# Patient Record
Sex: FEMALE | Race: BLACK OR AFRICAN AMERICAN | NOT HISPANIC OR LATINO | Employment: FULL TIME | URBAN - METROPOLITAN AREA
[De-identification: names, ages, dates, MRNs, and addresses within clinical notes are randomized per-mention and may not be internally consistent; named-entity substitution may affect disease eponyms.]

---

## 2023-04-18 ENCOUNTER — OFFICE VISIT (OUTPATIENT)
Dept: PRIMARY CARE | Facility: CLINIC | Age: 43
End: 2023-04-18
Payer: COMMERCIAL

## 2023-04-18 ENCOUNTER — LAB (OUTPATIENT)
Dept: LAB | Facility: LAB | Age: 43
End: 2023-04-18
Payer: COMMERCIAL

## 2023-04-18 VITALS
OXYGEN SATURATION: 100 % | SYSTOLIC BLOOD PRESSURE: 154 MMHG | HEIGHT: 64 IN | HEART RATE: 74 BPM | BODY MASS INDEX: 32.4 KG/M2 | WEIGHT: 189.8 LBS | DIASTOLIC BLOOD PRESSURE: 100 MMHG

## 2023-04-18 DIAGNOSIS — I10 PRIMARY HYPERTENSION: ICD-10-CM

## 2023-04-18 DIAGNOSIS — J45.20 MILD INTERMITTENT ASTHMA WITHOUT COMPLICATION (HHS-HCC): ICD-10-CM

## 2023-04-18 DIAGNOSIS — R35.0 URINARY FREQUENCY: ICD-10-CM

## 2023-04-18 DIAGNOSIS — Z00.00 HEALTHCARE MAINTENANCE: ICD-10-CM

## 2023-04-18 DIAGNOSIS — Z00.00 HEALTHCARE MAINTENANCE: Primary | ICD-10-CM

## 2023-04-18 PROCEDURE — 3077F SYST BP >= 140 MM HG: CPT | Performed by: PHYSICIAN ASSISTANT

## 2023-04-18 PROCEDURE — 3080F DIAST BP >= 90 MM HG: CPT | Performed by: PHYSICIAN ASSISTANT

## 2023-04-18 PROCEDURE — 82306 VITAMIN D 25 HYDROXY: CPT

## 2023-04-18 PROCEDURE — 99396 PREV VISIT EST AGE 40-64: CPT | Performed by: PHYSICIAN ASSISTANT

## 2023-04-18 PROCEDURE — 80061 LIPID PANEL: CPT

## 2023-04-18 PROCEDURE — 87086 URINE CULTURE/COLONY COUNT: CPT

## 2023-04-18 PROCEDURE — 80053 COMPREHEN METABOLIC PANEL: CPT

## 2023-04-18 PROCEDURE — 36415 COLL VENOUS BLD VENIPUNCTURE: CPT

## 2023-04-18 PROCEDURE — 1036F TOBACCO NON-USER: CPT | Performed by: PHYSICIAN ASSISTANT

## 2023-04-18 PROCEDURE — 81003 URINALYSIS AUTO W/O SCOPE: CPT

## 2023-04-18 PROCEDURE — 84443 ASSAY THYROID STIM HORMONE: CPT

## 2023-04-18 PROCEDURE — 83036 HEMOGLOBIN GLYCOSYLATED A1C: CPT

## 2023-04-18 PROCEDURE — 85025 COMPLETE CBC W/AUTO DIFF WBC: CPT

## 2023-04-18 RX ORDER — LEVONORGESTREL 52 MG/1
1 INTRAUTERINE DEVICE INTRAUTERINE ONCE
COMMUNITY

## 2023-04-18 RX ORDER — FLUTICASONE PROPIONATE 50 MCG
2 SPRAY, SUSPENSION (ML) NASAL 2 TIMES DAILY
COMMUNITY
Start: 2023-04-07

## 2023-04-18 ASSESSMENT — PATIENT HEALTH QUESTIONNAIRE - PHQ9
1. LITTLE INTEREST OR PLEASURE IN DOING THINGS: NOT AT ALL
2. FEELING DOWN, DEPRESSED OR HOPELESS: NOT AT ALL
SUM OF ALL RESPONSES TO PHQ9 QUESTIONS 1 AND 2: 0

## 2023-04-18 NOTE — PROGRESS NOTES
"Subjective   Sunita Real is a 42 y.o. female who presents for Annual Exam.  HPI 42-year-old female presenting for physical exam. Overall doing well.  Currently complaining of:    Sinus troubles: went to  and got ATBs, didn't get relief. Scheduled with ENT and they saw no infection or polyps. She was using Afrin nasal spray daily and developed rhinitis medicamentosa. ENT prescribed her oral steroids (finished yesterday) and she has received some relief of symptoms. She is using Flonase nasal spray 2x daily. Scheduled in June with ENT again  Urinary frequency. No other symptoms of UTI (fevers, chills, confusion, hematuria, dysuria, burning with urination, urinary urgency)    HTN: Not currently on any medications, managing through diet and exercise.  Endorses having occasional headache. Denies any dizziness, chest pain, SOB/KAMARA, LE edema.     Health maintenance:  Immunizations:  -Flu: deferred to fall 2023  -Tdap: Due, obtain from office or pharmacy  Mammogram UTD (last 11/13/2022)   PAP UTD (last 10/8/2020)   Dental care: follows for annual cleanings  Eye care: none, no contact lenses or glasses    Last CPE: 4/18/2023 (commercial)    BP (!) 154/100   Pulse 74   Ht 1.626 m (5' 4\")   Wt 86.1 kg (189 lb 12.8 oz)   SpO2 100%   BMI 32.58 kg/m²   Objective   Physical Exam  Vitals reviewed.   Constitutional:       General: She is not in acute distress.     Appearance: Normal appearance.   HENT:      Head: Normocephalic and atraumatic.      Right Ear: Tympanic membrane, ear canal and external ear normal. There is no impacted cerumen.      Left Ear: Tympanic membrane, ear canal and external ear normal. There is no impacted cerumen.      Nose: Nose normal. No congestion or rhinorrhea.      Mouth/Throat:      Mouth: Mucous membranes are moist.      Pharynx: Oropharynx is clear. No oropharyngeal exudate or posterior oropharyngeal erythema.   Eyes:      General: No scleral icterus.        Right eye: No " discharge.         Left eye: No discharge.      Extraocular Movements: Extraocular movements intact.      Conjunctiva/sclera: Conjunctivae normal.      Pupils: Pupils are equal, round, and reactive to light.   Cardiovascular:      Rate and Rhythm: Normal rate and regular rhythm.      Heart sounds: Normal heart sounds. No murmur heard.     No friction rub. No gallop.   Pulmonary:      Effort: Pulmonary effort is normal. No respiratory distress.      Breath sounds: Normal breath sounds. No stridor. No wheezing, rhonchi or rales.   Abdominal:      General: Bowel sounds are normal. There is no distension.      Palpations: Abdomen is soft. There is no mass.      Tenderness: There is no abdominal tenderness. There is no right CVA tenderness or left CVA tenderness.   Musculoskeletal:         General: Normal range of motion.      Cervical back: Normal range of motion and neck supple.      Right lower leg: No edema.      Left lower leg: No edema.   Skin:     General: Skin is warm and dry.      Findings: No rash.   Neurological:      General: No focal deficit present.      Mental Status: She is alert and oriented to person, place, and time. Mental status is at baseline.      Cranial Nerves: No cranial nerve deficit.      Gait: Gait normal.   Psychiatric:         Mood and Affect: Mood normal.         Behavior: Behavior normal.       Assessment/Plan   Problem List Items Addressed This Visit       Healthcare maintenance - Primary     Discussed age-appropriate preventative health measures.  CPE labs ordered         Relevant Orders    CBC and Auto Differential (Completed)    Comprehensive Metabolic Panel (Completed)    Vitamin D, Total (Completed)    TSH with reflex to Free T4 if abnormal (Completed)    Hemoglobin A1C (Completed)    Lipid Panel (Completed)    Urinary frequency     Urinalysis and urine culture ordered.  Increase water intake.  We will treat as clinically indicated         Relevant Orders    Urine Culture  (Completed)    Urinalysis with Reflex Microscopic (Completed)    Mild intermittent asthma without complication     Stable.  Refilled albuterol inhaler         Relevant Medications    albuterol 90 mcg/actuation inhaler    Primary hypertension     Patient deferred initiation of medication at this time, will work on diet and exercise.  RTC in 4 to 6 weeks or sooner as needed.  I recommend initiation of losartan 25 mg with titration up from there if needed.  Follow strict DASH diet and exercise regularly.  Monitor BP at home and log readings

## 2023-04-19 LAB
ALANINE AMINOTRANSFERASE (SGPT) (U/L) IN SER/PLAS: 20 U/L (ref 7–45)
ALBUMIN (G/DL) IN SER/PLAS: 4.3 G/DL (ref 3.4–5)
ALKALINE PHOSPHATASE (U/L) IN SER/PLAS: 61 U/L (ref 33–110)
ANION GAP IN SER/PLAS: 12 MMOL/L (ref 10–20)
APPEARANCE, URINE: CLEAR
ASPARTATE AMINOTRANSFERASE (SGOT) (U/L) IN SER/PLAS: 21 U/L (ref 9–39)
BASOPHILS (10*3/UL) IN BLOOD BY AUTOMATED COUNT: 0.02 X10E9/L (ref 0–0.1)
BASOPHILS/100 LEUKOCYTES IN BLOOD BY AUTOMATED COUNT: 0.3 % (ref 0–2)
BILIRUBIN TOTAL (MG/DL) IN SER/PLAS: 0.5 MG/DL (ref 0–1.2)
BILIRUBIN, URINE: NEGATIVE
BLOOD, URINE: NEGATIVE
CALCIDIOL (25 OH VITAMIN D3) (NG/ML) IN SER/PLAS: 36 NG/ML
CALCIUM (MG/DL) IN SER/PLAS: 9.5 MG/DL (ref 8.6–10.6)
CARBON DIOXIDE, TOTAL (MMOL/L) IN SER/PLAS: 30 MMOL/L (ref 21–32)
CHLORIDE (MMOL/L) IN SER/PLAS: 101 MMOL/L (ref 98–107)
CHOLESTEROL (MG/DL) IN SER/PLAS: 190 MG/DL (ref 0–199)
CHOLESTEROL IN HDL (MG/DL) IN SER/PLAS: 49.8 MG/DL
CHOLESTEROL/HDL RATIO: 3.8
COLOR, URINE: ABNORMAL
CREATININE (MG/DL) IN SER/PLAS: 0.83 MG/DL (ref 0.5–1.05)
EOSINOPHILS (10*3/UL) IN BLOOD BY AUTOMATED COUNT: 0.09 X10E9/L (ref 0–0.7)
EOSINOPHILS/100 LEUKOCYTES IN BLOOD BY AUTOMATED COUNT: 1.5 % (ref 0–6)
ERYTHROCYTE DISTRIBUTION WIDTH (RATIO) BY AUTOMATED COUNT: 11.8 % (ref 11.5–14.5)
ERYTHROCYTE MEAN CORPUSCULAR HEMOGLOBIN CONCENTRATION (G/DL) BY AUTOMATED: 31.6 G/DL (ref 32–36)
ERYTHROCYTE MEAN CORPUSCULAR VOLUME (FL) BY AUTOMATED COUNT: 100 FL (ref 80–100)
ERYTHROCYTES (10*6/UL) IN BLOOD BY AUTOMATED COUNT: 4.09 X10E12/L (ref 4–5.2)
ESTIMATED AVERAGE GLUCOSE FOR HBA1C: 94 MG/DL
GFR FEMALE: 90 ML/MIN/1.73M2
GLUCOSE (MG/DL) IN SER/PLAS: 81 MG/DL (ref 74–99)
GLUCOSE, URINE: NEGATIVE MG/DL
HEMATOCRIT (%) IN BLOOD BY AUTOMATED COUNT: 40.8 % (ref 36–46)
HEMOGLOBIN (G/DL) IN BLOOD: 12.9 G/DL (ref 12–16)
HEMOGLOBIN A1C/HEMOGLOBIN TOTAL IN BLOOD: 4.9 %
IMMATURE GRANULOCYTES/100 LEUKOCYTES IN BLOOD BY AUTOMATED COUNT: 0.2 % (ref 0–0.9)
KETONES, URINE: NEGATIVE MG/DL
LDL: 127 MG/DL (ref 0–99)
LEUKOCYTE ESTERASE, URINE: NEGATIVE
LEUKOCYTES (10*3/UL) IN BLOOD BY AUTOMATED COUNT: 6 X10E9/L (ref 4.4–11.3)
LYMPHOCYTES (10*3/UL) IN BLOOD BY AUTOMATED COUNT: 1.59 X10E9/L (ref 1.2–4.8)
LYMPHOCYTES/100 LEUKOCYTES IN BLOOD BY AUTOMATED COUNT: 26.5 % (ref 13–44)
MONOCYTES (10*3/UL) IN BLOOD BY AUTOMATED COUNT: 0.72 X10E9/L (ref 0.1–1)
MONOCYTES/100 LEUKOCYTES IN BLOOD BY AUTOMATED COUNT: 12 % (ref 2–10)
NEUTROPHILS (10*3/UL) IN BLOOD BY AUTOMATED COUNT: 3.57 X10E9/L (ref 1.2–7.7)
NEUTROPHILS/100 LEUKOCYTES IN BLOOD BY AUTOMATED COUNT: 59.5 % (ref 40–80)
NITRITE, URINE: NEGATIVE
NRBC (PER 100 WBCS) BY AUTOMATED COUNT: 0 /100 WBC (ref 0–0)
PH, URINE: 7 (ref 5–8)
PLATELETS (10*3/UL) IN BLOOD AUTOMATED COUNT: 295 X10E9/L (ref 150–450)
POTASSIUM (MMOL/L) IN SER/PLAS: 4.1 MMOL/L (ref 3.5–5.3)
PROTEIN TOTAL: 8.3 G/DL (ref 6.4–8.2)
PROTEIN, URINE: NEGATIVE MG/DL
SODIUM (MMOL/L) IN SER/PLAS: 139 MMOL/L (ref 136–145)
SPECIFIC GRAVITY, URINE: 1 (ref 1–1.03)
THYROTROPIN (MIU/L) IN SER/PLAS BY DETECTION LIMIT <= 0.05 MIU/L: 1.33 MIU/L (ref 0.44–3.98)
TRIGLYCERIDE (MG/DL) IN SER/PLAS: 65 MG/DL (ref 0–149)
UREA NITROGEN (MG/DL) IN SER/PLAS: 10 MG/DL (ref 6–23)
URINE CULTURE: NORMAL
UROBILINOGEN, URINE: <2 MG/DL (ref 0–1.9)
VLDL: 13 MG/DL (ref 0–40)

## 2023-04-20 PROBLEM — J30.2 SEASONAL ALLERGIES: Status: ACTIVE | Noted: 2023-04-20

## 2023-04-20 PROBLEM — L05.91 PILONIDAL CYST: Status: RESOLVED | Noted: 2023-04-20 | Resolved: 2023-04-20

## 2023-04-20 PROBLEM — J30.89 SEASONAL AND PERENNIAL ALLERGIC RHINOCONJUNCTIVITIS OF BOTH EYES: Status: ACTIVE | Noted: 2023-04-20

## 2023-04-20 PROBLEM — N89.8 VAGINAL DISCHARGE: Status: RESOLVED | Noted: 2023-04-20 | Resolved: 2023-04-20

## 2023-04-20 PROBLEM — J45.20 MILD INTERMITTENT ASTHMA WITHOUT COMPLICATION (HHS-HCC): Status: ACTIVE | Noted: 2023-04-20

## 2023-04-20 PROBLEM — L80 VITILIGO: Status: ACTIVE | Noted: 2023-04-20

## 2023-04-20 PROBLEM — R00.2 PALPITATION: Status: ACTIVE | Noted: 2023-04-20

## 2023-04-20 PROBLEM — H10.13 SEASONAL AND PERENNIAL ALLERGIC RHINOCONJUNCTIVITIS OF BOTH EYES: Status: ACTIVE | Noted: 2023-04-20

## 2023-04-20 PROBLEM — T78.02XA ANAPHYLAXIS DUE TO SHELLFISH: Status: ACTIVE | Noted: 2023-04-20

## 2023-04-20 PROBLEM — Z97.5 IUD (INTRAUTERINE DEVICE) IN PLACE: Status: ACTIVE | Noted: 2023-04-20

## 2023-04-20 PROBLEM — R01.1 MURMUR: Status: ACTIVE | Noted: 2023-04-20

## 2023-04-20 PROBLEM — J30.2 SEASONAL AND PERENNIAL ALLERGIC RHINOCONJUNCTIVITIS OF BOTH EYES: Status: ACTIVE | Noted: 2023-04-20

## 2023-04-20 PROBLEM — R35.0 URINARY FREQUENCY: Status: ACTIVE | Noted: 2023-04-20

## 2023-04-20 PROBLEM — Z00.00 HEALTHCARE MAINTENANCE: Status: ACTIVE | Noted: 2023-04-20

## 2023-04-20 PROBLEM — R03.0 ELEVATED BLOOD PRESSURE READING WITHOUT DIAGNOSIS OF HYPERTENSION: Status: RESOLVED | Noted: 2023-04-20 | Resolved: 2023-04-20

## 2023-04-20 PROBLEM — L02.92 BOIL: Status: RESOLVED | Noted: 2023-04-20 | Resolved: 2023-04-20

## 2023-04-20 PROBLEM — I10 PRIMARY HYPERTENSION: Status: ACTIVE | Noted: 2023-04-20

## 2023-04-20 RX ORDER — ALBUTEROL SULFATE 90 UG/1
2 AEROSOL, METERED RESPIRATORY (INHALATION) EVERY 6 HOURS PRN
Qty: 18 G | Refills: 5 | Status: SHIPPED | OUTPATIENT
Start: 2023-04-20

## 2023-04-20 NOTE — ASSESSMENT & PLAN NOTE
Patient deferred initiation of medication at this time, will work on diet and exercise.  RTC in 4 to 6 weeks or sooner as needed.  I recommend initiation of losartan 25 mg with titration up from there if needed.  Follow strict DASH diet and exercise regularly.  Monitor BP at home and log readings   denies

## 2023-04-20 NOTE — ASSESSMENT & PLAN NOTE
Urinalysis and urine culture ordered.  Increase water intake.  We will treat as clinically indicated

## 2023-04-20 NOTE — RESULT ENCOUNTER NOTE
Lab results are back.    Protein level was slightly above normal.  If following a high-protein diet, cut back slightly and increase water intake.    Lipid panel overall looks good with the exception of elevated LDL (bad cholesterol).  Cut back on any saturated fats and carbohydrates.  Increase exercise level as tolerated    Vitamin D level was in the low normal range.  I encouraged her to begin taking an over-the-counter vitamin D3 1000 unit supplement daily with food    Blood counts, hemoglobin A1c, thyroid levels all look good.  Urine was negative for any UTI.  No antibiotics needed.

## 2023-04-21 ENCOUNTER — TELEPHONE (OUTPATIENT)
Dept: PRIMARY CARE | Facility: CLINIC | Age: 43
End: 2023-04-21
Payer: COMMERCIAL

## 2023-04-21 NOTE — TELEPHONE ENCOUNTER
Pt informed of results.    ----- Message from Eboni Troy PA-C sent at 4/19/2023  9:08 PM EDT -----  Lab results are back.    Protein level was slightly above normal.  If following a high-protein diet, cut back slightly and increase water intake.    Lipid panel overall looks good with the exception of elevated LDL (bad cholesterol).  Cut back on any saturated fats and carbohydrates.  Increase exercise level as tolerated    Vitamin D level was in the low normal range.  I encouraged her to begin taking an over-the-counter vitamin D3 1000 unit supplement daily with food    Blood counts, hemoglobin A1c, thyroid levels all look good.  Urine was negative for any UTI.  No antibiotics needed.

## 2023-06-30 ENCOUNTER — APPOINTMENT (OUTPATIENT)
Dept: PRIMARY CARE | Facility: CLINIC | Age: 43
End: 2023-06-30
Payer: COMMERCIAL

## 2023-07-13 ENCOUNTER — APPOINTMENT (OUTPATIENT)
Dept: PRIMARY CARE | Facility: CLINIC | Age: 43
End: 2023-07-13
Payer: COMMERCIAL

## 2023-08-14 ENCOUNTER — OFFICE VISIT (OUTPATIENT)
Dept: PRIMARY CARE | Facility: CLINIC | Age: 43
End: 2023-08-14
Payer: COMMERCIAL

## 2023-08-14 VITALS
TEMPERATURE: 98.3 F | SYSTOLIC BLOOD PRESSURE: 139 MMHG | BODY MASS INDEX: 31.93 KG/M2 | WEIGHT: 186 LBS | DIASTOLIC BLOOD PRESSURE: 86 MMHG | HEART RATE: 77 BPM | OXYGEN SATURATION: 97 % | RESPIRATION RATE: 16 BRPM

## 2023-08-14 DIAGNOSIS — I10 PRIMARY HYPERTENSION: Primary | ICD-10-CM

## 2023-08-14 DIAGNOSIS — E78.5 HYPERLIPIDEMIA, UNSPECIFIED HYPERLIPIDEMIA TYPE: ICD-10-CM

## 2023-08-14 PROCEDURE — 3075F SYST BP GE 130 - 139MM HG: CPT | Performed by: STUDENT IN AN ORGANIZED HEALTH CARE EDUCATION/TRAINING PROGRAM

## 2023-08-14 PROCEDURE — 1036F TOBACCO NON-USER: CPT | Performed by: STUDENT IN AN ORGANIZED HEALTH CARE EDUCATION/TRAINING PROGRAM

## 2023-08-14 PROCEDURE — 3079F DIAST BP 80-89 MM HG: CPT | Performed by: STUDENT IN AN ORGANIZED HEALTH CARE EDUCATION/TRAINING PROGRAM

## 2023-08-14 PROCEDURE — 99204 OFFICE O/P NEW MOD 45 MIN: CPT | Performed by: STUDENT IN AN ORGANIZED HEALTH CARE EDUCATION/TRAINING PROGRAM

## 2023-08-14 RX ORDER — HYDROCHLOROTHIAZIDE 25 MG/1
25 TABLET ORAL DAILY
Qty: 30 TABLET | Refills: 2 | Status: SHIPPED | OUTPATIENT
Start: 2023-08-14 | End: 2023-11-15 | Stop reason: SDUPTHER

## 2023-08-14 ASSESSMENT — ENCOUNTER SYMPTOMS
WHEEZING: 0
SHORTNESS OF BREATH: 0
WEAKNESS: 0
NUMBNESS: 0
HEMATURIA: 0
ACTIVITY CHANGE: 0
CONSTIPATION: 0
DIZZINESS: 0
FEVER: 0
SPEECH DIFFICULTY: 0
COUGH: 0
DYSURIA: 0
ABDOMINAL PAIN: 0
VOMITING: 0
NAUSEA: 0

## 2023-08-14 NOTE — PROGRESS NOTES
Subjective   Patient ID: Sunita Real is a 42 y.o. female who presents for Hypertension.  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  She was recently seen in the emergency room when she experienced dizziness.  She was diagnosed to have elevated blood pressure-reports at least systolic in the 200s, was sent home with 12.5 mg hydrochlorothiazide.  Today she presents to recheck her blood pressure.  Denies any complaints or concerns.    She denies any hospitalizations or surgeries in the past    Past Medical History:   Diagnosis Date    Boil 04/20/2023    Elevated blood pressure reading without diagnosis of hypertension 04/20/2023    Other specified health status 10/09/2014    Known health problems: none    Pilonidal cyst 04/20/2023    Vaginal discharge 04/20/2023      No past surgical history on file.   Family History   Problem Relation Name Age of Onset    Hypertension Mother      Hypertension Brother      Lung cancer Maternal Grandmother          smoker    Diabetes Neg Hx      Heart disease Neg Hx      Sudden death Neg Hx        Allergies   Allergen Reactions    Shellfish Derived Anaphylaxis    Penicillins Hives          Occupation:     Review of Systems   Constitutional:  Negative for activity change and fever.   HENT:  Negative for congestion.    Respiratory:  Negative for cough, shortness of breath and wheezing.    Cardiovascular:  Negative for chest pain and leg swelling.   Gastrointestinal:  Negative for abdominal pain, constipation, nausea and vomiting.   Endocrine: Negative for cold intolerance.   Genitourinary:  Negative for dysuria, hematuria and urgency.   Neurological:  Negative for dizziness, speech difficulty, weakness and numbness.   Psychiatric/Behavioral:  Negative for self-injury and suicidal ideas.        Objective   Visit Vitals  /86   Pulse 77   Temp 36.8 °C (98.3 °F)   Resp 16   Wt 84.4 kg  (186 lb)   SpO2 97%   BMI 31.93 kg/m²   OB Status Implant   Smoking Status Never   BSA 1.95 m²      Physical Exam  Constitutional:       Appearance: Normal appearance.   HENT:      Head: Normocephalic and atraumatic.      Nose: Nose normal.      Mouth/Throat:      Mouth: Mucous membranes are moist.   Eyes:      Conjunctiva/sclera: Conjunctivae normal.      Pupils: Pupils are equal, round, and reactive to light.   Cardiovascular:      Rate and Rhythm: Normal rate and regular rhythm.      Pulses: Normal pulses.      Heart sounds: Normal heart sounds.   Pulmonary:      Effort: Pulmonary effort is normal.      Breath sounds: Normal breath sounds.   Musculoskeletal:         General: Normal range of motion.      Cervical back: Neck supple.   Skin:     General: Skin is warm.   Neurological:      General: No focal deficit present.      Mental Status: She is alert and oriented to person, place, and time.   Psychiatric:         Mood and Affect: Mood normal.         Behavior: Behavior normal.         Thought Content: Thought content normal.         Judgment: Judgment normal.         Assessment/Plan     Problem List Items Addressed This Visit       Primary hypertension - Primary    Relevant Medications    hydroCHLOROthiazide (HYDRODiuril) 25 mg tablet    Other Relevant Orders    Comprehensive Metabolic Panel     Other Visit Diagnoses       Hyperlipidemia, unspecified hyperlipidemia type            Overall patient is here for to establish care.  1.  Hypertension  Slightly above goal  Advised to increase hydrochlorothiazide to 25 mg daily, lifestyle modifications  CMP in 1 week to check electrolytes  Follow-up in 3 months for a brief blood pressure check  2.  Hyperlipidemia  Low ASCVD score.  Continue lifestyle modification.

## 2023-09-02 ENCOUNTER — LAB (OUTPATIENT)
Dept: LAB | Facility: LAB | Age: 43
End: 2023-09-02
Payer: COMMERCIAL

## 2023-09-02 DIAGNOSIS — I10 PRIMARY HYPERTENSION: ICD-10-CM

## 2023-09-02 LAB
ALANINE AMINOTRANSFERASE (SGPT) (U/L) IN SER/PLAS: 16 U/L (ref 7–45)
ALBUMIN (G/DL) IN SER/PLAS: 4.1 G/DL (ref 3.4–5)
ALKALINE PHOSPHATASE (U/L) IN SER/PLAS: 52 U/L (ref 33–110)
ANION GAP IN SER/PLAS: 13 MMOL/L (ref 10–20)
ASPARTATE AMINOTRANSFERASE (SGOT) (U/L) IN SER/PLAS: 14 U/L (ref 9–39)
BILIRUBIN TOTAL (MG/DL) IN SER/PLAS: 0.4 MG/DL (ref 0–1.2)
CALCIUM (MG/DL) IN SER/PLAS: 9.5 MG/DL (ref 8.6–10.6)
CARBON DIOXIDE, TOTAL (MMOL/L) IN SER/PLAS: 31 MMOL/L (ref 21–32)
CHLORIDE (MMOL/L) IN SER/PLAS: 101 MMOL/L (ref 98–107)
CREATININE (MG/DL) IN SER/PLAS: 0.79 MG/DL (ref 0.5–1.05)
GFR FEMALE: >90 ML/MIN/1.73M2
GLUCOSE (MG/DL) IN SER/PLAS: 85 MG/DL (ref 74–99)
POTASSIUM (MMOL/L) IN SER/PLAS: 4 MMOL/L (ref 3.5–5.3)
PROTEIN TOTAL: 7.5 G/DL (ref 6.4–8.2)
SODIUM (MMOL/L) IN SER/PLAS: 141 MMOL/L (ref 136–145)
UREA NITROGEN (MG/DL) IN SER/PLAS: 11 MG/DL (ref 6–23)

## 2023-09-02 PROCEDURE — 80053 COMPREHEN METABOLIC PANEL: CPT

## 2023-09-02 PROCEDURE — 36415 COLL VENOUS BLD VENIPUNCTURE: CPT

## 2023-11-15 ENCOUNTER — OFFICE VISIT (OUTPATIENT)
Dept: PRIMARY CARE | Facility: CLINIC | Age: 43
End: 2023-11-15
Payer: COMMERCIAL

## 2023-11-15 VITALS
BODY MASS INDEX: 32.1 KG/M2 | SYSTOLIC BLOOD PRESSURE: 134 MMHG | DIASTOLIC BLOOD PRESSURE: 88 MMHG | RESPIRATION RATE: 16 BRPM | HEIGHT: 64 IN | WEIGHT: 188 LBS | OXYGEN SATURATION: 98 % | TEMPERATURE: 98 F | HEART RATE: 81 BPM

## 2023-11-15 DIAGNOSIS — J45.20 MILD INTERMITTENT ASTHMA WITHOUT COMPLICATION (HHS-HCC): ICD-10-CM

## 2023-11-15 DIAGNOSIS — I10 PRIMARY HYPERTENSION: Primary | ICD-10-CM

## 2023-11-15 DIAGNOSIS — E78.5 HYPERLIPIDEMIA, UNSPECIFIED HYPERLIPIDEMIA TYPE: ICD-10-CM

## 2023-11-15 DIAGNOSIS — T78.40XS ALLERGY, SEQUELA: ICD-10-CM

## 2023-11-15 PROCEDURE — 1036F TOBACCO NON-USER: CPT | Performed by: STUDENT IN AN ORGANIZED HEALTH CARE EDUCATION/TRAINING PROGRAM

## 2023-11-15 PROCEDURE — 99214 OFFICE O/P EST MOD 30 MIN: CPT | Performed by: STUDENT IN AN ORGANIZED HEALTH CARE EDUCATION/TRAINING PROGRAM

## 2023-11-15 PROCEDURE — 3075F SYST BP GE 130 - 139MM HG: CPT | Performed by: STUDENT IN AN ORGANIZED HEALTH CARE EDUCATION/TRAINING PROGRAM

## 2023-11-15 PROCEDURE — 3079F DIAST BP 80-89 MM HG: CPT | Performed by: STUDENT IN AN ORGANIZED HEALTH CARE EDUCATION/TRAINING PROGRAM

## 2023-11-15 RX ORDER — HYDROCHLOROTHIAZIDE 25 MG/1
25 TABLET ORAL DAILY
Qty: 30 TABLET | Refills: 2 | Status: SHIPPED | OUTPATIENT
Start: 2023-11-15 | End: 2023-11-27

## 2023-11-15 ASSESSMENT — ENCOUNTER SYMPTOMS
WHEEZING: 0
DEPRESSION: 0
WEAKNESS: 0
NAUSEA: 0
NUMBNESS: 0
SPEECH DIFFICULTY: 0
FEVER: 0
COUGH: 0
ABDOMINAL PAIN: 0
DYSURIA: 0
DIZZINESS: 0
SHORTNESS OF BREATH: 0
CONSTIPATION: 0
VOMITING: 0
HEMATURIA: 0
ACTIVITY CHANGE: 0

## 2023-11-15 NOTE — PROGRESS NOTES
Subjective   Patient ID: Sunita Real is a 42 y.o. female who presents for Follow-up.  HPI  Sunita has a history of hypertension, asthma and hyperlipidemia is here for a follow-up.  She requests paperwork for accommodations given that she has asthma attacks with dust when she does not work in allergen free conditions/dust free conditions  Reports she was formally diagnosed with asthma in early 20s with a PFT.  Has had to use albuterol only when she is exposed to dust.  She also had an allergen test with the allergist in the past which did reveal allergy to various respiratory allergens including grass.      Declines flu/prevnar          Past Medical History:   Diagnosis Date    Boil 04/20/2023    Elevated blood pressure reading without diagnosis of hypertension 04/20/2023    Other specified health status 10/09/2014    Known health problems: none    Pilonidal cyst 04/20/2023    Vaginal discharge 04/20/2023      No past surgical history on file.   Family History   Problem Relation Name Age of Onset    Hypertension Mother      Hypertension Brother      Lung cancer Maternal Grandmother          smoker    Diabetes Neg Hx      Heart disease Neg Hx      Sudden death Neg Hx        Allergies   Allergen Reactions    Shellfish Derived Anaphylaxis    Penicillins Hives          Occupation:     Review of Systems   Constitutional:  Negative for activity change and fever.   HENT:  Negative for congestion.    Respiratory:  Negative for cough, shortness of breath and wheezing.    Cardiovascular:  Negative for chest pain and leg swelling.   Gastrointestinal:  Negative for abdominal pain, constipation, nausea and vomiting.   Endocrine: Negative for cold intolerance.   Genitourinary:  Negative for dysuria, hematuria and urgency.   Neurological:  Negative for dizziness, speech difficulty, weakness and numbness.   Psychiatric/Behavioral:  Negative for self-injury and suicidal ideas.        Objective   Visit Vitals  BP  "134/88   Pulse 81   Temp 36.7 °C (98 °F)   Resp 16   Ht 1.626 m (5' 4\")   Wt 85.3 kg (188 lb)   SpO2 98%   BMI 32.27 kg/m²   OB Status Implant   Smoking Status Never   BSA 1.96 m²      Physical Exam  Vitals reviewed: Physical exam on  virtual visit is limited.   Constitutional:       Appearance: Normal appearance.   HENT:      Head: Normocephalic and atraumatic.      Nose: Congestion present.   Pulmonary:      Effort: Pulmonary effort is normal.   Neurological:      Mental Status: She is alert and oriented to person, place, and time.   Psychiatric:         Mood and Affect: Mood normal.         Assessment/Plan   Problem List Items Addressed This Visit       Mild intermittent asthma without complication    Primary hypertension - Primary    Relevant Medications    hydroCHLOROthiazide (HYDRODiuril) 25 mg tablet    Other Relevant Orders    Basic metabolic panel     Other Visit Diagnoses       Hyperlipidemia, unspecified hyperlipidemia type        Relevant Orders    Lipid Panel    CBC    TSH with reflex to Free T4 if abnormal    Allergy, sequela            Blood pressure at goal.  Continue hydrochlorothiazide.  We will repeat blood work including basic metabolic panel  Hyperlipidemia-repeat lipid panel lifestyle modifications discussed.  Allergies/asthma-paperwork filled.  Reports she has albuterol inhalers.  She will call back when she needs refills.     "

## 2023-11-25 DIAGNOSIS — I10 PRIMARY HYPERTENSION: ICD-10-CM

## 2023-11-27 RX ORDER — HYDROCHLOROTHIAZIDE 25 MG/1
25 TABLET ORAL DAILY
Qty: 30 TABLET | Refills: 2 | Status: SHIPPED | OUTPATIENT
Start: 2023-11-27

## 2023-12-27 ENCOUNTER — ANCILLARY PROCEDURE (OUTPATIENT)
Dept: RADIOLOGY | Facility: CLINIC | Age: 43
End: 2023-12-27
Payer: COMMERCIAL

## 2023-12-27 DIAGNOSIS — Z12.31 SCREENING MAMMOGRAM FOR BREAST CANCER: ICD-10-CM

## 2023-12-27 PROCEDURE — 77067 SCR MAMMO BI INCL CAD: CPT

## 2023-12-27 PROCEDURE — 77063 BREAST TOMOSYNTHESIS BI: CPT | Performed by: RADIOLOGY

## 2023-12-27 PROCEDURE — 77067 SCR MAMMO BI INCL CAD: CPT | Performed by: RADIOLOGY

## 2024-01-12 ENCOUNTER — LAB (OUTPATIENT)
Dept: LAB | Facility: LAB | Age: 44
End: 2024-01-12
Payer: COMMERCIAL

## 2024-01-12 ENCOUNTER — APPOINTMENT (OUTPATIENT)
Dept: LAB | Facility: LAB | Age: 44
End: 2024-01-12
Payer: COMMERCIAL

## 2024-01-12 DIAGNOSIS — E78.5 HYPERLIPIDEMIA, UNSPECIFIED HYPERLIPIDEMIA TYPE: ICD-10-CM

## 2024-01-12 DIAGNOSIS — I10 PRIMARY HYPERTENSION: ICD-10-CM

## 2024-01-12 LAB
ANION GAP SERPL CALC-SCNC: 13 MMOL/L (ref 10–20)
BUN SERPL-MCNC: 10 MG/DL (ref 6–23)
CALCIUM SERPL-MCNC: 9 MG/DL (ref 8.6–10.6)
CHLORIDE SERPL-SCNC: 100 MMOL/L (ref 98–107)
CHOLEST SERPL-MCNC: 179 MG/DL (ref 0–199)
CHOLESTEROL/HDL RATIO: 3.2
CO2 SERPL-SCNC: 30 MMOL/L (ref 21–32)
CREAT SERPL-MCNC: 0.79 MG/DL (ref 0.5–1.05)
EGFRCR SERPLBLD CKD-EPI 2021: >90 ML/MIN/1.73M*2
ERYTHROCYTE [DISTWIDTH] IN BLOOD BY AUTOMATED COUNT: 11.8 % (ref 11.5–14.5)
GLUCOSE SERPL-MCNC: 84 MG/DL (ref 74–99)
HCT VFR BLD AUTO: 39 % (ref 36–46)
HDLC SERPL-MCNC: 56.6 MG/DL
HGB BLD-MCNC: 12.7 G/DL (ref 12–16)
LDLC SERPL CALC-MCNC: 106 MG/DL
MCH RBC QN AUTO: 31.7 PG (ref 26–34)
MCHC RBC AUTO-ENTMCNC: 32.6 G/DL (ref 32–36)
MCV RBC AUTO: 97 FL (ref 80–100)
NON HDL CHOLESTEROL: 122 MG/DL (ref 0–149)
NRBC BLD-RTO: 0 /100 WBCS (ref 0–0)
PLATELET # BLD AUTO: 282 X10*3/UL (ref 150–450)
POTASSIUM SERPL-SCNC: 3.6 MMOL/L (ref 3.5–5.3)
RBC # BLD AUTO: 4.01 X10*6/UL (ref 4–5.2)
SODIUM SERPL-SCNC: 139 MMOL/L (ref 136–145)
TRIGL SERPL-MCNC: 82 MG/DL (ref 0–149)
VLDL: 16 MG/DL (ref 0–40)
WBC # BLD AUTO: 6 X10*3/UL (ref 4.4–11.3)

## 2024-01-12 PROCEDURE — 80048 BASIC METABOLIC PNL TOTAL CA: CPT

## 2024-01-12 PROCEDURE — 80061 LIPID PANEL: CPT

## 2024-01-12 PROCEDURE — 85027 COMPLETE CBC AUTOMATED: CPT

## 2024-01-12 PROCEDURE — 36415 COLL VENOUS BLD VENIPUNCTURE: CPT

## 2024-01-12 PROCEDURE — 84443 ASSAY THYROID STIM HORMONE: CPT

## 2024-01-13 LAB — TSH SERPL-ACNC: 0.99 MIU/L (ref 0.44–3.98)

## 2024-07-03 ENCOUNTER — APPOINTMENT (OUTPATIENT)
Dept: OBSTETRICS AND GYNECOLOGY | Facility: CLINIC | Age: 44
End: 2024-07-03
Payer: COMMERCIAL

## 2024-07-15 ENCOUNTER — OFFICE VISIT (OUTPATIENT)
Dept: OBSTETRICS AND GYNECOLOGY | Facility: CLINIC | Age: 44
End: 2024-07-15
Payer: COMMERCIAL

## 2024-07-15 VITALS
HEART RATE: 71 BPM | DIASTOLIC BLOOD PRESSURE: 86 MMHG | HEIGHT: 64 IN | SYSTOLIC BLOOD PRESSURE: 157 MMHG | BODY MASS INDEX: 33.22 KG/M2 | WEIGHT: 194.6 LBS

## 2024-07-15 DIAGNOSIS — Z01.411 ENCOUNTER FOR WELL WOMAN EXAM WITH ABNORMAL FINDINGS: Primary | ICD-10-CM

## 2024-07-15 DIAGNOSIS — Z12.31 BREAST CANCER SCREENING BY MAMMOGRAM: ICD-10-CM

## 2024-07-15 DIAGNOSIS — L90.0 LICHEN SCLEROSUS: ICD-10-CM

## 2024-07-15 DIAGNOSIS — N32.81 OVERACTIVE BLADDER: ICD-10-CM

## 2024-07-15 DIAGNOSIS — N76.1 CHRONIC VAGINITIS: ICD-10-CM

## 2024-07-15 DIAGNOSIS — Z12.4 CERVICAL CANCER SCREENING: ICD-10-CM

## 2024-07-15 PROCEDURE — 3077F SYST BP >= 140 MM HG: CPT | Performed by: OBSTETRICS & GYNECOLOGY

## 2024-07-15 PROCEDURE — 99214 OFFICE O/P EST MOD 30 MIN: CPT | Performed by: OBSTETRICS & GYNECOLOGY

## 2024-07-15 PROCEDURE — 87661 TRICHOMONAS VAGINALIS AMPLIF: CPT | Performed by: OBSTETRICS & GYNECOLOGY

## 2024-07-15 PROCEDURE — 99396 PREV VISIT EST AGE 40-64: CPT | Performed by: OBSTETRICS & GYNECOLOGY

## 2024-07-15 PROCEDURE — 87186 SC STD MICRODIL/AGAR DIL: CPT | Performed by: OBSTETRICS & GYNECOLOGY

## 2024-07-15 PROCEDURE — 87205 SMEAR GRAM STAIN: CPT | Performed by: OBSTETRICS & GYNECOLOGY

## 2024-07-15 PROCEDURE — 87491 CHLMYD TRACH DNA AMP PROBE: CPT | Performed by: OBSTETRICS & GYNECOLOGY

## 2024-07-15 PROCEDURE — 1036F TOBACCO NON-USER: CPT | Performed by: OBSTETRICS & GYNECOLOGY

## 2024-07-15 PROCEDURE — 3079F DIAST BP 80-89 MM HG: CPT | Performed by: OBSTETRICS & GYNECOLOGY

## 2024-07-15 RX ORDER — TRIAMCINOLONE ACETONIDE 1 MG/G
OINTMENT TOPICAL
Qty: 60 G | Refills: 2 | Status: SHIPPED | OUTPATIENT
Start: 2024-07-15

## 2024-07-15 ASSESSMENT — ENCOUNTER SYMPTOMS
OCCASIONAL FEELINGS OF UNSTEADINESS: 0
CARDIOVASCULAR NEGATIVE: 0
ENDOCRINE NEGATIVE: 0
CONSTITUTIONAL NEGATIVE: 0
LOSS OF SENSATION IN FEET: 0
RESPIRATORY NEGATIVE: 0
DEPRESSION: 0
GASTROINTESTINAL NEGATIVE: 0
HEMATOLOGIC/LYMPHATIC NEGATIVE: 0
ALLERGIC/IMMUNOLOGIC NEGATIVE: 0
EYES NEGATIVE: 0
NEUROLOGICAL NEGATIVE: 0
MUSCULOSKELETAL NEGATIVE: 0
PSYCHIATRIC NEGATIVE: 0

## 2024-07-15 ASSESSMENT — PATIENT HEALTH QUESTIONNAIRE - PHQ9
SUM OF ALL RESPONSES TO PHQ9 QUESTIONS 1 AND 2: 0
1. LITTLE INTEREST OR PLEASURE IN DOING THINGS: NOT AT ALL
2. FEELING DOWN, DEPRESSED OR HOPELESS: NOT AT ALL

## 2024-07-15 ASSESSMENT — COLUMBIA-SUICIDE SEVERITY RATING SCALE - C-SSRS
2. HAVE YOU ACTUALLY HAD ANY THOUGHTS OF KILLING YOURSELF?: NO
6. HAVE YOU EVER DONE ANYTHING, STARTED TO DO ANYTHING, OR PREPARED TO DO ANYTHING TO END YOUR LIFE?: NO
1. IN THE PAST MONTH, HAVE YOU WISHED YOU WERE DEAD OR WISHED YOU COULD GO TO SLEEP AND NOT WAKE UP?: NO

## 2024-07-15 ASSESSMENT — PAIN SCALES - GENERAL: PAINLEVEL: 0-NO PAIN

## 2024-07-15 NOTE — PROGRESS NOTES
"Assessment   PLAN  Thank you for coming to your annual exam. We discussed healthy lifestyle, well woman screening, and other diagnoses listed below.    Sunita was seen today for annual exam.  Diagnoses and all orders for this visit:  Encounter for well woman exam with abnormal findings (Primary)  Chronic vaginitis  -     Vaginitis Gram Stain For Bacterial Vaginosis + Yeast  Overactive bladder  -     Urine Culture  -     Referral to Urogynecology; Future  Cervical cancer screening  -     THINPREP PAP  Breast cancer screening by mammogram  -     BI mammo bilateral screening tomosynthesis; Future  Lichen sclerosus  -     triamcinolone (Kenalog) 0.1 % ointment; Apply a thin layer of ointment nightly to affected areas x 2 weeks, then space to twice weekly    Please return for your next visit in 1 year.    Juanita Rodriguez MD    Subjective     Sunita Real is a 43 y.o. female who is here for a routine exam.   PCP = Meli Collier MD    APE Concerns: none    Other Concerns:   Hx recurrent vaginitis. No current symptoms. Sometimes has itching by top part or the urethra and at introitus. Denies dryness. When she starts to get symptoms, she takes a vaginal suppository (not boric acid - broke her out, it's a vH brand prebiotic moisturizer) from [employer].   Unable to hold her urine, \"when I have to go, I have to go\" x 6 months. Urine culture sent today. Ref to UroGyn for urodynamic testing. Hydrochlorothiazide use not thought to be a trigger, has been using for about a year.  Has some boils where she shaves. Try waxing, using a straight edge razor, clippers instead of traditional razors which can full on the hair follicle  LS visible on vulva at areas pt describes as itchy. Pt recalls steroid ointment helping in the past. Recommended to resume now, with plan for lifelong maintenance.  BP elevated in our office but nl at PCP. No symptoms today. FU with PCP as indicated.     OB History    Para Term " " AB Living   3 2 2   1     SAB IAB Ectopic Multiple Live Births     1            # Outcome Date GA Lbr Tomas/2nd Weight Sex Type Anes PTL Lv   3 Term  40w0d    Vag-Spont      2 Term  38w0d    Vag-Spont      1 IAB              GynHx:  Menarche: 12  Menstrual Pattern: Amenorrheic on IUD  STIs: h/o trich  Sexual Activity: men, monogamous, + low libido likely multifactorial  Contraception: Mirena replaced 2020    Pap Hx:   - neg cotest    Preventative:  Last mammogram: [facility] Cat Dec 2023  Last Colonoscopy: due age 45  DM Screening: nl gluc on CMP in   DEXA: due age 65  HPV vaccination: not received  Exercise: none regular  Diet: no restrictions  Seat Belt Use: always    SoHx:  Living Situation: lives with  and 2 sons  School/Employment: financial   Substance: No T/D. EtOH social (2 per week)  Abuse: denies  Depression Screen: negative    Past Medical History:   Diagnosis Date    Asthma ([facility])     Hypertension     on HCTZ 25mg daily    Pilonidal cyst 2023    Seasonal allergies      History reviewed. No pertinent surgical history.    Family History   Problem Relation Name Age of Onset    Hypertension Mother      Hypertension Brother      Lung cancer Maternal Grandmother          smoker    Diabetes Neg Hx      Heart disease Neg Hx      Sudden death Neg Hx       Objective   Ht 1.626 m (5' 4\")   Wt 88.3 kg (194 lb 9.6 oz)   BMI 33.40 kg/m²      General:   Alert and oriented, in no acute distress   Neck: Supple. No visible thyromegaly.    Breast/Axilla: Normal to palpation bilaterally without masses, skin changes, or nipple discharge.    Abdomen: Soft, non-tender, without masses or organomegaly   Vulva: Normal architecture. LS at top of vulva near clitoral boyle with thickening of skin and hypopigmentation. Erythema at introitus.     Vagina: Normal mucosa without lesions, masses, or atrophy. No abnormal vaginal discharge.    Cervix: Normal without masses, lesions, or " signs of cervicitis.    Uterus: Normal mobile, non-enlarged uterus    Adnexa: Normal without masses or lesions   Pelvic Floor No POP noted. No high tone pelvic floor    Psych Normal affect. Normal mood.

## 2024-07-15 NOTE — PATIENT INSTRUCTIONS
Here are some common referrals and locations used by Dr. Rodriguez:    ObGyn Offices  [facility]: [address] [facility]. [phone].  [place]/[facility]: [facility] [place], [place]. [phone].  [facility] @ the [facility]: [email or web address], [phone].  Labor & Delivery  [facility]: [address] [place], [place]. This will take you to the entrance of [facility], which connects to [facility]. The parking garage is also closest to this address, and there is a  as well. Walk through [facility] towards the atrium, then [facility] will be on your right just past the gift shop. Labor and Delivery is located on the 2nd floor via elevator.   [facility] [place] at [facility]: [address]. Entrance is located between the Emergency Department and the Main Hospital building. Look for the [place] logo above the entrance. You can also enter from the entrance to the [facility] - just turn right past the information desk and walk down the gallery [place] until you reach [facility]. Labor and Delivery is located on the 3rd floor via elevator.  Labs (walk-ins, no appointment needed)  [facility]: Main building, 1st floor, past the waiting area by the gift shop. Open M-F 7:30 AM - 6 PM, Sat 8 AM - 12 PM.  [place]: 1st floor right of the elevators. Open M- F 9 AM - 5:45 PM, Sat 9 AM - 12 PM.  For more lab locations and hours, please visit: [email or web address]   Specialty Referral Scheduling: [phone]  Radiology Scheduling, including Mammography and Pelvic Ultrasound: [phone]  Colonoscopy Scheduling: [phone]  Physical Therapy Scheduling: [phone]  [facility] Scheduling - Integrative Medicine, Acupuncture, Chiropractice, Medical Massage, etc: [phone]  Fetal Echocardiogram Scheduling: [phone]  [facility]yn Behavioral Health Scheduling: [phone]  Lactation Services Scheduling: [phone]  [facility] ([facility]): [phone]    For all other questions, call [facility] at [phone].

## 2024-07-16 LAB
C TRACH RRNA SPEC QL NAA+PROBE: NEGATIVE
CLUE CELLS VAG LPF-#/AREA: NORMAL /[LPF]
N GONORRHOEA DNA SPEC QL PROBE+SIG AMP: NEGATIVE
NUGENT SCORE: 0
T VAGINALIS RRNA SPEC QL NAA+PROBE: NEGATIVE
YEAST VAG WET PREP-#/AREA: NORMAL

## 2024-07-18 ENCOUNTER — TELEPHONE (OUTPATIENT)
Dept: RADIOLOGY | Facility: CLINIC | Age: 44
End: 2024-07-18
Payer: COMMERCIAL

## 2024-07-18 DIAGNOSIS — N30.00 ACUTE CYSTITIS WITHOUT HEMATURIA: ICD-10-CM

## 2024-07-18 LAB — BACTERIA UR CULT: ABNORMAL

## 2024-07-18 RX ORDER — SULFAMETHOXAZOLE AND TRIMETHOPRIM 800; 160 MG/1; MG/1
1 TABLET ORAL 2 TIMES DAILY
Qty: 6 TABLET | Refills: 0 | Status: SHIPPED | OUTPATIENT
Start: 2024-07-18 | End: 2024-07-21

## 2024-07-18 NOTE — TELEPHONE ENCOUNTER
Patient saw positive urine culture on mychart and had questions.  Discussed results and will let Sunita know once an antibiotic has been sent to the pharmacy.

## 2024-07-18 NOTE — TELEPHONE ENCOUNTER
Patient has positive urine culture.    Advised will let her know when medication is sent to pharmacy.

## 2024-07-22 LAB
CYTOLOGY CMNT CVX/VAG CYTO-IMP: NORMAL
HPV HR 12 DNA GENITAL QL NAA+PROBE: NEGATIVE
HPV HR GENOTYPES PNL CVX NAA+PROBE: NEGATIVE
HPV16 DNA SPEC QL NAA+PROBE: NEGATIVE
HPV18 DNA SPEC QL NAA+PROBE: NEGATIVE
LAB AP HPV GENOTYPE QUESTION: YES
LAB AP HPV HR: NORMAL
LAB AP PAP ADDITIONAL TESTS: NORMAL
LABORATORY COMMENT REPORT: NORMAL
PATH REPORT.TOTAL CANCER: NORMAL

## 2024-08-14 DIAGNOSIS — I10 PRIMARY HYPERTENSION: ICD-10-CM

## 2024-08-15 RX ORDER — HYDROCHLOROTHIAZIDE 25 MG/1
25 TABLET ORAL DAILY
Qty: 30 TABLET | Refills: 0 | Status: SHIPPED | OUTPATIENT
Start: 2024-08-15

## 2024-08-30 ENCOUNTER — APPOINTMENT (OUTPATIENT)
Dept: PRIMARY CARE | Facility: CLINIC | Age: 44
End: 2024-08-30
Payer: COMMERCIAL

## 2024-08-30 VITALS
HEIGHT: 64 IN | SYSTOLIC BLOOD PRESSURE: 134 MMHG | OXYGEN SATURATION: 99 % | HEART RATE: 67 BPM | WEIGHT: 190 LBS | BODY MASS INDEX: 32.44 KG/M2 | DIASTOLIC BLOOD PRESSURE: 85 MMHG

## 2024-08-30 DIAGNOSIS — E78.5 HYPERLIPIDEMIA, UNSPECIFIED HYPERLIPIDEMIA TYPE: Primary | ICD-10-CM

## 2024-08-30 DIAGNOSIS — I10 PRIMARY HYPERTENSION: ICD-10-CM

## 2024-08-30 PROCEDURE — 3079F DIAST BP 80-89 MM HG: CPT | Performed by: STUDENT IN AN ORGANIZED HEALTH CARE EDUCATION/TRAINING PROGRAM

## 2024-08-30 PROCEDURE — 3008F BODY MASS INDEX DOCD: CPT | Performed by: STUDENT IN AN ORGANIZED HEALTH CARE EDUCATION/TRAINING PROGRAM

## 2024-08-30 PROCEDURE — 3075F SYST BP GE 130 - 139MM HG: CPT | Performed by: STUDENT IN AN ORGANIZED HEALTH CARE EDUCATION/TRAINING PROGRAM

## 2024-08-30 PROCEDURE — 99214 OFFICE O/P EST MOD 30 MIN: CPT | Performed by: STUDENT IN AN ORGANIZED HEALTH CARE EDUCATION/TRAINING PROGRAM

## 2024-08-30 RX ORDER — HYDROCHLOROTHIAZIDE 25 MG/1
25 TABLET ORAL DAILY
Qty: 30 TABLET | Refills: 3 | Status: SHIPPED | OUTPATIENT
Start: 2024-08-30

## 2024-08-30 ASSESSMENT — PATIENT HEALTH QUESTIONNAIRE - PHQ9
SUM OF ALL RESPONSES TO PHQ9 QUESTIONS 1 AND 2: 0
2. FEELING DOWN, DEPRESSED OR HOPELESS: NOT AT ALL
1. LITTLE INTEREST OR PLEASURE IN DOING THINGS: NOT AT ALL

## 2024-08-30 NOTE — PROGRESS NOTES
"Subjective   Patient ID: Sunita Real is a 43 y.o. female who presents for Follow-up (Follow up- htn).  HPI  Patient here for a follow-up.  No concerns endorsed.  History of hyperlipidemia-diet controlled, hypertension on hydrochlorothiazide 25 mg  Past Medical History:   Diagnosis Date    Asthma ([facility])     Hypertension     on HCTZ 25mg daily    Pilonidal cyst 04/20/2023    Seasonal allergies       No past surgical history on file.   Family History   Problem Relation Name Age of Onset    Hypertension Mother      Hypertension Brother      Lung cancer Maternal Grandmother          smoker    Diabetes Neg Hx      Heart disease Neg Hx      Sudden death Neg Hx        Allergies   Allergen Reactions    Shellfish Derived Anaphylaxis    Penicillins Hives          Occupation:     Review of Systems   Constitutional:  Negative for activity change and fever.   HENT:  Negative for congestion.    Respiratory:  Negative for cough, shortness of breath and wheezing.    Cardiovascular:  Negative for chest pain and leg swelling.   Gastrointestinal:  Negative for abdominal pain, constipation, nausea and vomiting.   Endocrine: Negative for cold intolerance.   Genitourinary:  Negative for dysuria, hematuria and urgency.   Neurological:  Negative for dizziness, speech difficulty, weakness and numbness.   Psychiatric/Behavioral:  Negative for self-injury and suicidal ideas.        Objective   Visit Vitals  /85 (Patient Position: Sitting)   Pulse 67   Ht 1.626 m (5' 4\")   Wt 86.2 kg (190 lb)   SpO2 99%   BMI 32.61 kg/m²   OB Status Implant   Smoking Status Never   BSA 1.97 m²      Physical Exam  Constitutional:       Appearance: Normal appearance.   HENT:      Head: Normocephalic and atraumatic.      Nose: Nose normal.      Mouth/Throat:      Mouth: Mucous membranes are moist.   Eyes:      Conjunctiva/sclera: Conjunctivae normal.      Pupils: Pupils are equal, round, and reactive to light.   Cardiovascular:      Rate and " Rhythm: Normal rate and regular rhythm.      Pulses: Normal pulses.      Heart sounds: Normal heart sounds.   Pulmonary:      Effort: Pulmonary effort is normal.      Breath sounds: Normal breath sounds.   Musculoskeletal:         General: Normal range of motion.      Cervical back: Neck supple.   Skin:     General: Skin is warm.   Neurological:      General: No focal deficit present.      Mental Status: She is alert and oriented to person, place, and time.   Psychiatric:         Mood and Affect: Mood normal.         Behavior: Behavior normal.         Thought Content: Thought content normal.         Judgment: Judgment normal.         Assessment/Plan   Diagnoses and all orders for this visit:  Hyperlipidemia, unspecified hyperlipidemia type  Repeat labs.  Lifestyle modifications.-     Hemoglobin A1C; Future  -     Lipid Panel; Future  -     Comprehensive Metabolic Panel; Future  -     CBC; Future  Primary hypertension  Blood pressure at goal.  Continue medications for-     hydroCHLOROthiazide (HYDRODiuril) 25 mg tablet; Take 1 tablet (25 mg) by mouth once daily.

## 2024-09-13 ASSESSMENT — ENCOUNTER SYMPTOMS
WHEEZING: 0
DIZZINESS: 0
ACTIVITY CHANGE: 0
ABDOMINAL PAIN: 0
CONSTIPATION: 0
HEMATURIA: 0
COUGH: 0
SHORTNESS OF BREATH: 0
FEVER: 0
VOMITING: 0
SPEECH DIFFICULTY: 0
DYSURIA: 0
NUMBNESS: 0
WEAKNESS: 0
NAUSEA: 0

## 2024-11-21 ENCOUNTER — LAB (OUTPATIENT)
Dept: LAB | Facility: LAB | Age: 44
End: 2024-11-21
Payer: COMMERCIAL

## 2024-11-21 DIAGNOSIS — E78.5 HYPERLIPIDEMIA, UNSPECIFIED HYPERLIPIDEMIA TYPE: ICD-10-CM

## 2024-11-21 LAB
EST. AVERAGE GLUCOSE BLD GHB EST-MCNC: 91 MG/DL
HBA1C MFR BLD: 4.8 %

## 2024-11-21 PROCEDURE — 85027 COMPLETE CBC AUTOMATED: CPT

## 2024-11-21 PROCEDURE — 80053 COMPREHEN METABOLIC PANEL: CPT

## 2024-11-21 PROCEDURE — 36415 COLL VENOUS BLD VENIPUNCTURE: CPT

## 2024-11-21 PROCEDURE — 80061 LIPID PANEL: CPT

## 2024-11-21 PROCEDURE — 83036 HEMOGLOBIN GLYCOSYLATED A1C: CPT

## 2024-11-22 LAB
ALBUMIN SERPL BCP-MCNC: 4 G/DL (ref 3.4–5)
ALP SERPL-CCNC: 49 U/L (ref 33–110)
ALT SERPL W P-5'-P-CCNC: 12 U/L (ref 7–45)
ANION GAP SERPL CALC-SCNC: 12 MMOL/L (ref 10–20)
AST SERPL W P-5'-P-CCNC: 11 U/L (ref 9–39)
BILIRUB SERPL-MCNC: 0.4 MG/DL (ref 0–1.2)
BUN SERPL-MCNC: 9 MG/DL (ref 6–23)
CALCIUM SERPL-MCNC: 8.6 MG/DL (ref 8.6–10.6)
CHLORIDE SERPL-SCNC: 99 MMOL/L (ref 98–107)
CHOLEST SERPL-MCNC: 167 MG/DL (ref 0–199)
CHOLESTEROL/HDL RATIO: 3.4
CO2 SERPL-SCNC: 31 MMOL/L (ref 21–32)
CREAT SERPL-MCNC: 0.65 MG/DL (ref 0.5–1.05)
EGFRCR SERPLBLD CKD-EPI 2021: >90 ML/MIN/1.73M*2
ERYTHROCYTE [DISTWIDTH] IN BLOOD BY AUTOMATED COUNT: 11.6 % (ref 11.5–14.5)
GLUCOSE SERPL-MCNC: 82 MG/DL (ref 74–99)
HCT VFR BLD AUTO: 37.1 % (ref 36–46)
HDLC SERPL-MCNC: 49.7 MG/DL
HGB BLD-MCNC: 12.2 G/DL (ref 12–16)
LDLC SERPL CALC-MCNC: 103 MG/DL
MCH RBC QN AUTO: 31.9 PG (ref 26–34)
MCHC RBC AUTO-ENTMCNC: 32.9 G/DL (ref 32–36)
MCV RBC AUTO: 97 FL (ref 80–100)
NON HDL CHOLESTEROL: 117 MG/DL (ref 0–149)
NRBC BLD-RTO: 0 /100 WBCS (ref 0–0)
PLATELET # BLD AUTO: 274 X10*3/UL (ref 150–450)
POTASSIUM SERPL-SCNC: 3.7 MMOL/L (ref 3.5–5.3)
PROT SERPL-MCNC: 7.1 G/DL (ref 6.4–8.2)
RBC # BLD AUTO: 3.83 X10*6/UL (ref 4–5.2)
SODIUM SERPL-SCNC: 138 MMOL/L (ref 136–145)
TRIGL SERPL-MCNC: 73 MG/DL (ref 0–149)
VLDL: 15 MG/DL (ref 0–40)
WBC # BLD AUTO: 5.6 X10*3/UL (ref 4.4–11.3)

## 2024-12-30 ENCOUNTER — HOSPITAL ENCOUNTER (OUTPATIENT)
Dept: RADIOLOGY | Facility: CLINIC | Age: 44
Discharge: HOME | End: 2024-12-30
Payer: COMMERCIAL

## 2024-12-30 VITALS — HEIGHT: 64 IN | WEIGHT: 190.04 LBS | BODY MASS INDEX: 32.44 KG/M2

## 2024-12-30 DIAGNOSIS — Z12.31 BREAST CANCER SCREENING BY MAMMOGRAM: ICD-10-CM

## 2024-12-30 PROCEDURE — 77067 SCR MAMMO BI INCL CAD: CPT | Performed by: RADIOLOGY

## 2024-12-30 PROCEDURE — 77063 BREAST TOMOSYNTHESIS BI: CPT

## 2024-12-30 PROCEDURE — 77063 BREAST TOMOSYNTHESIS BI: CPT | Performed by: RADIOLOGY

## 2025-02-04 ENCOUNTER — APPOINTMENT (OUTPATIENT)
Dept: PRIMARY CARE | Facility: CLINIC | Age: 45
End: 2025-02-04
Payer: COMMERCIAL

## 2025-02-04 VITALS
BODY MASS INDEX: 32.95 KG/M2 | DIASTOLIC BLOOD PRESSURE: 87 MMHG | HEIGHT: 64 IN | OXYGEN SATURATION: 97 % | HEART RATE: 90 BPM | SYSTOLIC BLOOD PRESSURE: 143 MMHG | WEIGHT: 193 LBS

## 2025-02-04 DIAGNOSIS — L05.91 PILONIDAL CYST: ICD-10-CM

## 2025-02-04 DIAGNOSIS — R35.0 URINARY FREQUENCY: Primary | ICD-10-CM

## 2025-02-04 PROCEDURE — 3079F DIAST BP 80-89 MM HG: CPT | Performed by: STUDENT IN AN ORGANIZED HEALTH CARE EDUCATION/TRAINING PROGRAM

## 2025-02-04 PROCEDURE — 3008F BODY MASS INDEX DOCD: CPT | Performed by: STUDENT IN AN ORGANIZED HEALTH CARE EDUCATION/TRAINING PROGRAM

## 2025-02-04 PROCEDURE — 3077F SYST BP >= 140 MM HG: CPT | Performed by: STUDENT IN AN ORGANIZED HEALTH CARE EDUCATION/TRAINING PROGRAM

## 2025-02-04 PROCEDURE — 99213 OFFICE O/P EST LOW 20 MIN: CPT | Performed by: STUDENT IN AN ORGANIZED HEALTH CARE EDUCATION/TRAINING PROGRAM

## 2025-02-04 RX ORDER — SULFAMETHOXAZOLE AND TRIMETHOPRIM 800; 160 MG/1; MG/1
1 TABLET ORAL 2 TIMES DAILY
Qty: 10 TABLET | Refills: 0 | Status: SHIPPED | OUTPATIENT
Start: 2025-02-04 | End: 2025-02-09

## 2025-02-05 LAB
APPEARANCE UR: CLEAR
BACTERIA #/AREA URNS HPF: NORMAL /HPF
BACTERIA UR CULT: NORMAL
BILIRUB UR QL STRIP: NEGATIVE
COLOR UR: YELLOW
GLUCOSE UR QL STRIP: NEGATIVE
HGB UR QL STRIP: NEGATIVE
HYALINE CASTS #/AREA URNS LPF: NORMAL /LPF
KETONES UR QL STRIP: NEGATIVE
LEUKOCYTE ESTERASE UR QL STRIP: NEGATIVE
NITRITE UR QL STRIP: NEGATIVE
PH UR STRIP: 7 [PH] (ref 5–8)
PROT UR QL STRIP: NEGATIVE
RBC #/AREA URNS HPF: NORMAL /HPF
SERVICE CMNT-IMP: NORMAL
SP GR UR STRIP: 1.01 (ref 1–1.03)
SQUAMOUS #/AREA URNS HPF: NORMAL /HPF
WBC #/AREA URNS HPF: NORMAL /HPF

## 2025-02-26 ENCOUNTER — OFFICE VISIT (OUTPATIENT)
Dept: SURGERY | Facility: CLINIC | Age: 45
End: 2025-02-26
Payer: COMMERCIAL

## 2025-02-26 VITALS
OXYGEN SATURATION: 97 % | WEIGHT: 194.8 LBS | HEIGHT: 64 IN | HEART RATE: 86 BPM | BODY MASS INDEX: 33.26 KG/M2 | TEMPERATURE: 98.2 F | SYSTOLIC BLOOD PRESSURE: 139 MMHG | DIASTOLIC BLOOD PRESSURE: 89 MMHG

## 2025-02-26 DIAGNOSIS — L05.91 PILONIDAL CYST: ICD-10-CM

## 2025-02-26 PROCEDURE — 99203 OFFICE O/P NEW LOW 30 MIN: CPT | Performed by: SURGERY

## 2025-02-26 PROCEDURE — 3075F SYST BP GE 130 - 139MM HG: CPT | Performed by: SURGERY

## 2025-02-26 PROCEDURE — 3008F BODY MASS INDEX DOCD: CPT | Performed by: SURGERY

## 2025-02-26 PROCEDURE — 3079F DIAST BP 80-89 MM HG: CPT | Performed by: SURGERY

## 2025-02-26 PROCEDURE — 99213 OFFICE O/P EST LOW 20 MIN: CPT | Performed by: SURGERY

## 2025-02-26 PROCEDURE — 1036F TOBACCO NON-USER: CPT | Performed by: SURGERY

## 2025-02-26 ASSESSMENT — PAIN SCALES - GENERAL: PAINLEVEL_OUTOF10: 0-NO PAIN

## 2025-02-26 NOTE — PROGRESS NOTES
"History Of Present Illness  Sunita Real is a 44 y.o. female presenting with 2 year history of intermittent tailbone pain and drainage. Had a \"procedure\" done over 20 years ago where she had to do some dressing changes and packing. Otherwise pretty healthy. HTN. Works from home     Past Medical History  Past Medical History:   Diagnosis Date    Asthma     Hypertension     on HCTZ 25mg daily    Pilonidal cyst 2023    Seasonal allergies        Surgical History  No past surgical history on file.     Social History  She reports that she has never smoked. She has never used smokeless tobacco. She reports current alcohol use of about 3.0 standard drinks of alcohol per week. She reports that she does not use drugs.    Family History  Family History   Problem Relation Name Age of Onset    Hypertension Mother      Hypertension Brother      Lung cancer Maternal Grandmother          smoker    Diabetes Neg Hx      Heart disease Neg Hx      Sudden death Neg Hx          Allergies  Shellfish derived and Penicillins    Review of Systems  Constitutional: no weight loss, no fevers, no malaise  HEENT: negative  Neck: negative  Pulmonary: no SOB, no cough  CV: no chest pain, otherwise negative  GI: no pain, no diarrhea, no bloody stools, no constipation  : no hematuria, retention.  MS: no aches/pains  Neurologic: negative  Skin: no rashes, lesions  HEME: no bleeding tendency, no bruising  Psych: no mood issues    Physical Exam  General: well appearing, no acute distress, well nourished  HEENT: normal  Neck: supple  Pulmonary: lungs clear to auscultation bilaterally  CV: RR, S1S2, no murmurs.  Pulses palpable and equal.  Good capillary refill  Abdomen: soft, non tender, no masses  : grossly normal external genitalia  MS: grossly normal  Neurologic: alert and oriented, strength/sensation intact  Skin: There is signs of prior surgery in the  cleft with some crosshatched scars.  At the most cephalad aspect " of the cleft there is a small area of induration with some purulent drainage.  No fluctuance or abscess.  Findings consistent with recurrent pilonidal disease  Psych: mood appropriate    Last Recorded Vitals  There were no vitals taken for this visit.    Relevant Results        Assessment/Plan       Patient likely with recurrent pilonidal disease.  She has been struggling with intermittent flareups and spontaneous drainage over the past couple of years.  Looks like she had prior surgery 20 years ago.  We reviewed potential long-term complications of untreated pilonidal disease.  We also discussed surgical options moving forward.  I explained the [place] cleft lift procedure.  We talked about potential complications of surgery and the anticipated postop recovery period.  She is going to talk to her family and think about it.  She will let me know if she decides with surgery.  This can be scheduled at her convenience to the spring       I spent 30 minutes in the professional and overall care of this patient.      Geoffrey Melara MD

## 2025-02-26 NOTE — LETTER
"February 26, 2025     Meli Collier MD  [address]  [facility], [address] [address]    Patient: Sunita Real   YOB: 1980   Date of Visit: 2/26/2025       Dear Dr. Meli Collier MD:    Thank you for referring Sunita Real to me for evaluation. Below are my notes for this consultation.  If you have questions, please do not hesitate to call me. I look forward to following your patient along with you.       Sincerely,     Geoffrey Melara MD      CC: No Recipients  ______________________________________________________________________________________    History Of Present Illness  Sunita Real is a 44 y.o. female presenting with 2 year history of intermittent tailbone pain and drainage. Had a \"procedure\" done over 20 years ago where she had to do some dressing changes and packing. Otherwise pretty healthy. HTN. Works from home     Past Medical History  Past Medical History:   Diagnosis Date   • Asthma    • Hypertension     on HCTZ 25mg daily   • Pilonidal cyst 04/20/2023   • Seasonal allergies        Surgical History  No past surgical history on file.     Social History  She reports that she has never smoked. She has never used smokeless tobacco. She reports current alcohol use of about 3.0 standard drinks of alcohol per week. She reports that she does not use drugs.    Family History  Family History   Problem Relation Name Age of Onset   • Hypertension Mother     • Hypertension Brother     • Lung cancer Maternal Grandmother          smoker   • Diabetes Neg Hx     • Heart disease Neg Hx     • Sudden death Neg Hx          Allergies  Shellfish derived and Penicillins    Review of Systems  Constitutional: no weight loss, no fevers, no malaise  HEENT: negative  Neck: negative  Pulmonary: no SOB, no cough  CV: no chest pain, otherwise negative  GI: no pain, no diarrhea, no bloody stools, no constipation  : no " hematuria, retention.  MS: no aches/pains  Neurologic: negative  Skin: no rashes, lesions  HEME: no bleeding tendency, no bruising  Psych: no mood issues    Physical Exam  General: well appearing, no acute distress, well nourished  HEENT: normal  Neck: supple  Pulmonary: lungs clear to auscultation bilaterally  CV: RR, S1S2, no murmurs.  Pulses palpable and equal.  Good capillary refill  Abdomen: soft, non tender, no masses  : grossly normal external genitalia  MS: grossly normal  Neurologic: alert and oriented, strength/sensation intact  Skin: There is signs of prior surgery in the  cleft with some crosshatched scars.  At the most cephalad aspect of the cleft there is a small area of induration with some purulent drainage.  No fluctuance or abscess.  Findings consistent with recurrent pilonidal disease  Psych: mood appropriate    Last Recorded Vitals  There were no vitals taken for this visit.    Relevant Results        Assessment/Plan      Patient likely with recurrent pilonidal disease.  She has been struggling with intermittent flareups and spontaneous drainage over the past couple of years.  Looks like she had prior surgery 20 years ago.  We reviewed potential long-term complications of untreated pilonidal disease.  We also discussed surgical options moving forward.  I explained the [place] cleft lift procedure.  We talked about potential complications of surgery and the anticipated postop recovery period.  She is going to talk to her family and think about it.  She will let me know if she decides with surgery.  This can be scheduled at her convenience to the spring       I spent 30 minutes in the professional and overall care of this patient.      Geoffrey Melara MD

## 2025-03-04 ASSESSMENT — ENCOUNTER SYMPTOMS
HEMATURIA: 0
DIZZINESS: 0
SHORTNESS OF BREATH: 0
SPEECH DIFFICULTY: 0
VOMITING: 0
WHEEZING: 0
NUMBNESS: 0
ABDOMINAL PAIN: 0
WEAKNESS: 0
CONSTIPATION: 0
COUGH: 0
FEVER: 0
DYSURIA: 0
ACTIVITY CHANGE: 0
NAUSEA: 0

## 2025-03-04 NOTE — PROGRESS NOTES
"Subjective   Patient ID: Sunita Real is a 44 y.o. female who presents for Follow-up (Possible UTI).  HPI  Patient is here for the following  Urinary frequency  No fever chills.  No abdominal pain, dysuria-         Will get urinalysis but will start Bactrim .    H/o Pilonidal cyst  -     Referral to General Surgery; Future  Past Medical History:   Diagnosis Date    Asthma     Hypertension     on HCTZ 25mg daily    Pilonidal cyst 04/20/2023    Seasonal allergies       No past surgical history on file.   Family History   Problem Relation Name Age of Onset    Hypertension Mother      Hypertension Brother      Lung cancer Maternal Grandmother          smoker    Diabetes Neg Hx      Heart disease Neg Hx      Sudden death Neg Hx        Allergies   Allergen Reactions    Shellfish Derived Anaphylaxis    Penicillins Hives          Occupation:     Review of Systems   Constitutional:  Negative for activity change and fever.   HENT:  Negative for congestion.    Respiratory:  Negative for cough, shortness of breath and wheezing.    Cardiovascular:  Negative for chest pain and leg swelling.   Gastrointestinal:  Negative for abdominal pain, constipation, nausea and vomiting.   Endocrine: Negative for cold intolerance.   Genitourinary:  Negative for dysuria, hematuria and urgency.   Neurological:  Negative for dizziness, speech difficulty, weakness and numbness.   Psychiatric/Behavioral:  Negative for self-injury and suicidal ideas.        Objective   Visit Vitals  /87 (BP Location: Left arm, Patient Position: Sitting, BP Cuff Size: Large adult)   Pulse 90   Ht 1.626 m (5' 4\")   Wt 87.5 kg (193 lb)   SpO2 97%   BMI 33.13 kg/m²   OB Status Implant   Smoking Status Never   BSA 1.99 m²      Physical Exam  Constitutional:       Appearance: Normal appearance.   HENT:      Head: Normocephalic and atraumatic.      Nose: Nose normal.      Mouth/Throat:      Mouth: Mucous membranes are moist.   Eyes:      " Conjunctiva/sclera: Conjunctivae normal.      Pupils: Pupils are equal, round, and reactive to light.   Cardiovascular:      Rate and Rhythm: Normal rate and regular rhythm.      Pulses: Normal pulses.      Heart sounds: Normal heart sounds.   Pulmonary:      Effort: Pulmonary effort is normal.      Breath sounds: Normal breath sounds.   Musculoskeletal:         General: Normal range of motion.      Cervical back: Neck supple.   Skin:     General: Skin is warm.   Neurological:      General: No focal deficit present.      Mental Status: She is alert and oriented to person, place, and time.   Psychiatric:         Mood and Affect: Mood normal.         Behavior: Behavior normal.         Thought Content: Thought content normal.         Judgment: Judgment normal.         Assessment/Plan   Diagnoses and all orders for this visit:  Urinary frequency  -     Urinalysis with Reflex Culture and Microscopic; Future  -     sulfamethoxazole-trimethoprim (Bactrim DS) 800-160 mg tablet; Take 1 tablet by mouth 2 times a day for 5 days.  Pilonidal cyst  -     Referral to General Surgery; Future

## 2025-03-06 ENCOUNTER — APPOINTMENT (OUTPATIENT)
Dept: ALLERGY | Facility: CLINIC | Age: 45
End: 2025-03-06
Payer: COMMERCIAL

## 2025-03-06 VITALS
TEMPERATURE: 97.7 F | HEART RATE: 76 BPM | BODY MASS INDEX: 33.12 KG/M2 | WEIGHT: 194 LBS | SYSTOLIC BLOOD PRESSURE: 147 MMHG | OXYGEN SATURATION: 96 % | HEIGHT: 64 IN | DIASTOLIC BLOOD PRESSURE: 89 MMHG

## 2025-03-06 DIAGNOSIS — H10.13 ALLERGIC CONJUNCTIVITIS OF BOTH EYES: ICD-10-CM

## 2025-03-06 DIAGNOSIS — J45.40 MODERATE PERSISTENT ASTHMA WITHOUT COMPLICATION (HHS-HCC): Primary | ICD-10-CM

## 2025-03-06 DIAGNOSIS — Z91.013 SHELLFISH ALLERGY: ICD-10-CM

## 2025-03-06 DIAGNOSIS — J30.89 ALLERGIC RHINITIS DUE TO DUST MITE: ICD-10-CM

## 2025-03-06 DIAGNOSIS — J30.1 SEASONAL ALLERGIC RHINITIS DUE TO POLLEN: ICD-10-CM

## 2025-03-06 DIAGNOSIS — J30.81 ALLERGIC RHINITIS DUE TO ANIMAL HAIR AND DANDER: ICD-10-CM

## 2025-03-06 PROCEDURE — 95004 PERQ TESTS W/ALRGNC XTRCS: CPT | Performed by: ALLERGY & IMMUNOLOGY

## 2025-03-06 PROCEDURE — 99204 OFFICE O/P NEW MOD 45 MIN: CPT | Performed by: ALLERGY & IMMUNOLOGY

## 2025-03-06 PROCEDURE — 94060 EVALUATION OF WHEEZING: CPT | Performed by: ALLERGY & IMMUNOLOGY

## 2025-03-06 RX ORDER — MINERAL OIL
180 ENEMA (ML) RECTAL DAILY PRN
Qty: 30 TABLET | Refills: 11 | Status: SHIPPED | OUTPATIENT
Start: 2025-03-06 | End: 2026-03-06

## 2025-03-06 RX ORDER — EPINEPHRINE 0.3 MG/.3ML
INJECTION INTRAMUSCULAR
Qty: 2 EACH | Refills: 1 | Status: SHIPPED | OUTPATIENT
Start: 2025-03-06

## 2025-03-06 RX ORDER — BUDESONIDE AND FORMOTEROL FUMARATE DIHYDRATE 160; 4.5 UG/1; UG/1
2 AEROSOL RESPIRATORY (INHALATION)
Qty: 10.2 G | Refills: 11 | Status: SHIPPED | OUTPATIENT
Start: 2025-03-06 | End: 2026-03-06

## 2025-03-06 RX ORDER — FLUTICASONE PROPIONATE 50 MCG
2 SPRAY, SUSPENSION (ML) NASAL DAILY
Qty: 16 G | Refills: 11 | Status: SHIPPED | OUTPATIENT
Start: 2025-03-06 | End: 2026-03-06

## 2025-03-06 RX ORDER — MOMETASONE FUROATE MONOHYDRATE 50 UG/1
2 SPRAY, METERED NASAL 2 TIMES DAILY
Qty: 17 G | Refills: 11 | Status: SHIPPED | OUTPATIENT
Start: 2025-03-06 | End: 2026-03-06

## 2025-03-06 RX ORDER — INHALER, ASSIST DEVICES
SPACER (EA) MISCELLANEOUS
Qty: 1 EACH | Refills: 0 | Status: SHIPPED | OUTPATIENT
Start: 2025-03-06

## 2025-03-06 RX ORDER — EPINEPHRINE 0.3 MG/.3ML
1 INJECTION, SOLUTION INTRAMUSCULAR ONCE AS NEEDED
Qty: 1 EACH | Refills: 1 | Status: SHIPPED | OUTPATIENT
Start: 2025-03-06

## 2025-03-06 RX ORDER — OLOPATADINE HYDROCHLORIDE 2 MG/ML
1 SOLUTION/ DROPS OPHTHALMIC DAILY PRN
Qty: 2.5 ML | Refills: 5 | Status: SHIPPED | OUTPATIENT
Start: 2025-03-06 | End: 2026-03-06

## 2025-03-06 ASSESSMENT — ENCOUNTER SYMPTOMS
EYE ITCHING: 1
MUSCULOSKELETAL NEGATIVE: 1
CONSTITUTIONAL NEGATIVE: 1
RHINORRHEA: 1
GASTROINTESTINAL NEGATIVE: 1
CARDIOVASCULAR NEGATIVE: 1
WHEEZING: 1

## 2025-03-06 NOTE — PROGRESS NOTES
Sunita MILLIGAN Domingo Addie presents for initial evaluation today.      Sunita MILLIGAN Domingo Addie was seen at the request of Meli Collier MD for a chief complaint of rhinoconjunctivitis; a report with my findings is being sent via written or electronic means to Meli Collier MD with my recommendations for treatment    She provides the following history:    Rhinitis:   Age of onset: 2 years ago  Seasonality: winter is the worst but also experiences symptoms year round  Eye itchiness/watering: yes  Sneezing: yes  Congestion: yes  Rhinorrhea: yes  Post-nasal drip: yes  Medications:  Oral medicines: Allegra PRN - does help but stuffiness returns when medication wears off the next day  Nasal sprays: Flonase - off and on since 2 years ago, has not been helpful  Eye drops: moisturizing drops  Triggers: dust especially at work   Has seen ENT - did not see any polyps or other anatomic cause  Had allergy testing years ago after shellfish reaction    Asthma:  Age of onset: teenager  Triggers: unsure  Frequency of daytime symptoms: 1-2x/week  Frequency of nighttime symptoms: 0x/week  Frequency of oral steroid use: 0x in the past year  History of ER visits for asthma: none since teenage years  History of hospitalizations for asthma: none  Nocturnal cough? no  Snoring? Yes, but no apnea  Symptoms with exercise? no  Current controller medicines: none currently;  Current reliever medicines: albuterol, no spacer   Effectiveness: yes  Singulair? Has been on in the past  Previously tried medicines:  years ago used symbicort and was taken off of it when asthma improved  PFTs > 6 years ago    Eczema: flares once or twice a year, uses triamcinolone when it flares which has been helpful. Uses aquaphor, sometimes vaseline    Food allergy:   Foods avoided: shellfish  Age at time of reaction: 15 or 16 years  Symptoms: hives, lip swelling, throat itching, difficulty breathing  Timing of symptom onset: within  "minutes  Treatment: benadryl  Exposure to food since reaction: in steam when cooked in restaurant - uses albuterol and then leaves the restaurant  Has epi-pen? yes  Tolerates: milk, egg, soy, wheat, peanut, walnuts, pecans, cashew, pistachios, almonds, hazelnuts, finned fish, sesame    Venom allergy:  never stung    Drug allergy:   Medication: penicillin-class antibiotic  8-10 years ago  Taken before? Yes as a child  Symptoms- hives  Symptoms started- after 1st dose  Symptoms stopped- in a few hours  Blistering, peeling, or intra-oral lesions? none  Treatment- benadryl and hydrocrotisone cream  Taken since? no     Previous testing: had SPT > 10 years ago, results not viewable in EMR, she recalls sensitization to shellfish, dog, cockroaches, mice, and probably others    Environmental History:  Type of home:  House  Pets in the house: Dog   Mold or moisture in the home: None  Bedroom kimberley: Carpet  Dust mite covers on bed:  No  Cigarette exposure in the home:  No  Occupation/School: typically works from home,     Pertinent Allergy/Immunology family history:  Son allergic to peanuts  Brother allergic to fish, asthma  Uncle allergic to fish    Review of Systems   Constitutional: Negative.    HENT:  Positive for congestion, postnasal drip, rhinorrhea and sneezing.    Eyes:  Positive for itching.   Respiratory:  Positive for wheezing.    Cardiovascular: Negative.    Gastrointestinal: Negative.    Musculoskeletal: Negative.    Skin: Negative.        Vital signs:  /89 (BP Location: Right arm, Patient Position: Sitting, BP Cuff Size: Adult)   Pulse 76   Temp 36.5 °C (97.7 °F)   Ht 1.626 m (5' 4\")   Wt 88 kg (194 lb) Comment: stated by patient  SpO2 96%   BMI 33.30 kg/m²     Physical Exam:  GENERAL: Alert, oriented and in no acute distress.     HEENT: EYES: No conjunctival injection or cobblestoning. Nose: nasal turbinates edematous and pale.  There is mild mucous stranding. No " polyps or blood noted. EARS: Tympanic membranes are clear. MOUTH: moist and pink with no exudates, ulcers, or thrush. NECK: No upper airway stridor noted.       HEART: regular rate and rhythm.       LUNGS: Clear to auscultation bilaterally. No wheezing, rhonchi or rales.        ABDOMEN: Positive bowel sounds, soft, nontender, nondistended.       EXTREMITIES: No clubbing or edema.        NEURO:  Normal affect.  Gait normal.      SKIN: No rash, hives, or angioedema noted    Office spirometry 25:  FEV1: 71% predicted  FVC:  86% predicted  FEV1/FVC: 0.667  FEF 25-75: 29% predicted   Inspiratory loops: not flattened  Post-bronchodilator assessment: +15% change    Environmental Allergy Testing:  Test Results (wheal/flare in mm)    Controls  Controls  Histamine:   Negative: 0    Trees:  Trees  American Beech: 5/10  Maykel: 3/5  Birch:   Black [place]: 4/15  [place]: 0  [place]: 5/15  [place]: 5/10  Elm:   [place]: 5/10  Maple: 0  [place]: 0  [place]: 0  [place]: 0  White poplar: 0     Grasses:  Grass  Bahia:   [place]erm[place]:   Jose:   KORT Grass Mix:   Sweet Vernal:     Weeds:  Weeds  Cocklebur:   Dandelion: 5/15  English Plantain: 0  Goldenrod: 0/15  Lambs Quarters:   Mugwort: 0  Pigweed: 4/15  Ragweed Mix: 4/10  Russian Thistle: 0  Yellow Dock: 0     Molds:  Molds  Alternaria: 0  Aspergillus: 0  Cladosporium: 0  Helminthosporium: 0  Penicillium: 0  Stemphyllium: 5/15    Animals/Dust Mite:  Animals  Cat:   AP do/28  Harvey Do/20  Mouse: 7/15  Cockroach:   Dust Mite F: 0  Dust Mite P: 0/10     Food Allergy Test Results  Shellfish  Clam:   Crab:   Lobster:   Scallops: 5/15  Shrimp:

## 2025-03-06 NOTE — PATIENT INSTRUCTIONS
It was nice to meet you today     Your environmental allergy testing today was positive to tree, grass, weed pollen, cat, dog, dust mite, cockroach, mold.  Please see below for environmental allergen avoidance recommendations.     You may use Flonase 2 sprays each nostril once daily    Technique for nasal spray administration:  First, look slightly down, breathe normally, you do not need to sniff while you spray.  To use the nose spray, place the tip in your nose with the opposite hand (left hand, right side of nose, right hand, left side of nose), and aim or point toward the outside of the nose.  Do not sniff or snort medication afterwards as this can cause most of the medication to be swallowed.  Rather, dab your nose with a tissue if any runs out.    Use Allegra (Fexofenadine) 180 mg once daily as needed    You may use olopatadine 0.2% eyedrops 1 drop each eye daily as needed    Use Symbicort 2 puffs twice daily with spacer and may use 2 puffs every 4-6 hours with spacer as needed up to 12 puffs daily (see SMART action plan)    Strictly avoid shellfish. Read ingredient labels. Notify friends, family, and restaurants of food allergy.  Carry auto-injectable epinephrine at all times of possible exposure    Xolair (omalizumab) has been approved for one or more food allergies as a treatment to reduce allergic reactions.   The website is www.xolair.[email or web address]. Please let us know if you are interested in discussing further.     Our nurse line phone number is [phone] if you have questions or concerns     We would like to see you in follow up in 3 months or sooner if needed    Environmental Allergy Testing:  Test Results (wheal/flare in mm)    Controls  Controls  Histamine: 8/25  Negative: 0    Trees:  Trees  American Beech: 5/10  Maykel: 3/5  Birch: 5/7  Black [place]: 4/15  [place]: 0  [place]: 5/15  [place]: 5/10  Elm: 5/12  [place]: 5/10  Maple: 0  [place]: 0  [place]: 0  [place]: 0  White poplar: 0      Grasses:  Grass  [place]:   [place]:   [place]:   KORT Grass Mix:   Sweet Vernal:     Weeds:  Weeds  Cocklebur:   Dandelion: 5/15  English Plantain:   Goldenrod: 0/15  Lambs Quarters:   Mugwort: 0  Pigweed: 4/15  Ragweed Mix: 4/10  Russian Thistle: 0  Yellow Dock: 0     Molds:  Molds  Alternaria: 0  Aspergillus: 0  Cladosporium: 0  Helminthosporium: 0  Penicillium: 0  Stemphyllium: 5/15    Animals/Dust Mite:  Animals  Cat:   AP do/28  Harvey Do/20  Mouse: 7/15  Cockroach:   Dust Mite F: 0  Dust Mite P: 0/10     Food Allergy Test Results  Shellfish  Clam:   Crab:   Lobster:   Scallops: 5/15  Shrimp:        Thank you for your visit to the [facility].  Part of a successful visit is taking home the information you learned today and using it to make things better.  These take home instructions are to help you, if you have questions or concerns, please contact us.     Please see below for recommendations on environmental allergy control or modification:     Pollen Avoidance--If you are sensitive to pollen, there are a few tips to help limit, but not avoid, exposure.     Minimize outdoor activity between 5am-10am, pollen levels are highest at this time.       Keep car windows closed when traveling.     Close house windows at night, use the air conditioning if necessary.     Check the pollen count to know what pollen is outside (http://aaaai.org/nab).       , Pet Avoidance     Keep pets out of the bedroom at all times.     Keep pets off of furniture and other surfaces like counter tops.     More frequent bathing can help      Groom your pet outside so hair and dander stay outside the home when brushed.     Consider using HEPA air purifier in bedroom    , and Dust mite control.            1.  Dust mite proof covers/encasing on the mattress, pillow and box spring.            2.  Wash sheets  and bed linens in hot water each week.          3.  Vacuum carpets in bedroom each week.          4.  Dust or wipe down any hard surfaces.          5.  Avoid using a humidifier in the bedroom      Limit Cigarette smoke exposure.  Smoking and second hand smoke can irritate the nose and sinuses.  You and the people around you will benefit from avoiding cigarette smoke.

## 2025-03-12 ENCOUNTER — TELEPHONE (OUTPATIENT)
Dept: ALLERGY | Facility: HOSPITAL | Age: 45
End: 2025-03-12
Payer: COMMERCIAL

## 2025-03-12 DIAGNOSIS — J30.81 ALLERGIC RHINITIS DUE TO ANIMAL HAIR AND DANDER: ICD-10-CM

## 2025-03-12 DIAGNOSIS — J30.1 SEASONAL ALLERGIC RHINITIS DUE TO POLLEN: ICD-10-CM

## 2025-03-12 DIAGNOSIS — J30.89 ALLERGIC RHINITIS DUE TO DUST MITE: ICD-10-CM

## 2025-03-12 RX ORDER — MOMETASONE FUROATE MONOHYDRATE 50 UG/1
2 SPRAY, METERED NASAL 2 TIMES DAILY
Qty: 17 G | Refills: 11 | Status: SHIPPED | OUTPATIENT
Start: 2025-03-12 | End: 2026-03-12

## 2025-04-02 DIAGNOSIS — I10 PRIMARY HYPERTENSION: ICD-10-CM

## 2025-04-04 RX ORDER — HYDROCHLOROTHIAZIDE 25 MG/1
25 TABLET ORAL DAILY
Qty: 30 TABLET | Refills: 3 | Status: SHIPPED | OUTPATIENT
Start: 2025-04-04

## 2025-06-09 ENCOUNTER — APPOINTMENT (OUTPATIENT)
Dept: ALLERGY | Facility: CLINIC | Age: 45
End: 2025-06-09
Payer: COMMERCIAL